# Patient Record
Sex: FEMALE | Race: WHITE | NOT HISPANIC OR LATINO | URBAN - METROPOLITAN AREA
[De-identification: names, ages, dates, MRNs, and addresses within clinical notes are randomized per-mention and may not be internally consistent; named-entity substitution may affect disease eponyms.]

---

## 2024-03-18 ENCOUNTER — OV NP (OUTPATIENT)
Dept: URBAN - METROPOLITAN AREA CLINIC 124 | Facility: CLINIC | Age: 24
End: 2024-03-18
Payer: COMMERCIAL

## 2024-03-18 ENCOUNTER — LAB (OUTPATIENT)
Dept: URBAN - METROPOLITAN AREA CLINIC 124 | Facility: CLINIC | Age: 24
End: 2024-03-18

## 2024-03-18 VITALS
BODY MASS INDEX: 19.61 KG/M2 | TEMPERATURE: 97 F | HEIGHT: 70 IN | SYSTOLIC BLOOD PRESSURE: 136 MMHG | HEART RATE: 79 BPM | DIASTOLIC BLOOD PRESSURE: 85 MMHG | WEIGHT: 137 LBS

## 2024-03-18 DIAGNOSIS — K59.09 CHRONIC CONSTIPATION: ICD-10-CM

## 2024-03-18 DIAGNOSIS — R07.89 CHEST TIGHTNESS: ICD-10-CM

## 2024-03-18 DIAGNOSIS — R14.2 ERUCTATION: ICD-10-CM

## 2024-03-18 DIAGNOSIS — R14.0 BLOATING: ICD-10-CM

## 2024-03-18 PROCEDURE — 99204 OFFICE O/P NEW MOD 45 MIN: CPT | Performed by: PHYSICIAN ASSISTANT

## 2024-03-18 PROCEDURE — 99244 OFF/OP CNSLTJ NEW/EST MOD 40: CPT | Performed by: PHYSICIAN ASSISTANT

## 2024-03-18 RX ORDER — LINACLOTIDE 72 UG/1
1 CAPSULE AT LEAST 30 MINUTES BEFORE THE FIRST MEAL OF THE DAY ON AN EMPTY STOMACH CAPSULE, GELATIN COATED ORAL ONCE A DAY
Qty: 30 | OUTPATIENT
Start: 2024-03-18 | End: 2024-04-17

## 2024-03-18 NOTE — HPI-TODAY'S VISIT:
This patient was referred by Dr. Ellie Ellington for an evaluation of chest tightness.  A copy of this will be sent to the referring provider. She notes her entire life she has never been able to burp and has had noisy gurgling in her chest with associated chest tightness x 1 year and gets worse when she eats and can't get that noisy gurgle. Sx make her feel like she cant get a deep breath. Sx worse after eating and after waking up. Saw ENT and put on PPI which helped with the noises but changed the chest pain--felt consistent chest soreness all day. Very mild heartburn once/week, improved with avoiding gerd triggers. Denies regurg or dysphagia. ENT rec r/o gerd and consider botox for cricopharyngeal tightness if no other cause Mild bloating and constipation at baseline, improved on cuturelle. Bowels 2/week on culturelle vs once a week without. On Fe+ for anemia chronic---very heavy cycles and hx anorexia. Dad with barretts but no FH GI malignancy

## 2024-04-16 ENCOUNTER — EGD W/ BRV (OUTPATIENT)
Dept: URBAN - METROPOLITAN AREA SURGERY CENTER 16 | Facility: SURGERY CENTER | Age: 24
End: 2024-04-16
Payer: COMMERCIAL

## 2024-04-16 DIAGNOSIS — K29.60 OTHER GASTRITIS WITHOUT BLEEDING: ICD-10-CM

## 2024-04-16 DIAGNOSIS — K31.89 OTHER DISEASES OF STOMACH AND DUODENUM: ICD-10-CM

## 2024-04-16 PROCEDURE — 43239 EGD BIOPSY SINGLE/MULTIPLE: CPT | Performed by: INTERNAL MEDICINE

## 2024-04-16 RX ORDER — LINACLOTIDE 72 UG/1
1 CAPSULE AT LEAST 30 MINUTES BEFORE THE FIRST MEAL OF THE DAY ON AN EMPTY STOMACH CAPSULE, GELATIN COATED ORAL ONCE A DAY
Qty: 30 | Status: ACTIVE | COMMUNITY
Start: 2024-03-18 | End: 2024-04-17

## 2024-04-18 ENCOUNTER — BRAVO RTN (OUTPATIENT)
Dept: URBAN - METROPOLITAN AREA CLINIC 92 | Facility: CLINIC | Age: 24
End: 2024-04-18

## 2024-05-06 ENCOUNTER — DASHBOARD ENCOUNTERS (OUTPATIENT)
Age: 24
End: 2024-05-06

## 2024-05-06 ENCOUNTER — OFFICE VISIT (OUTPATIENT)
Dept: URBAN - METROPOLITAN AREA CLINIC 124 | Facility: CLINIC | Age: 24
End: 2024-05-06
Payer: COMMERCIAL

## 2024-05-06 VITALS
HEIGHT: 70 IN | WEIGHT: 139 LBS | TEMPERATURE: 96.9 F | SYSTOLIC BLOOD PRESSURE: 127 MMHG | BODY MASS INDEX: 19.9 KG/M2 | DIASTOLIC BLOOD PRESSURE: 68 MMHG | HEART RATE: 77 BPM

## 2024-05-06 DIAGNOSIS — R10.13 DYSPEPSIA: ICD-10-CM

## 2024-05-06 DIAGNOSIS — K59.09 CHRONIC CONSTIPATION: ICD-10-CM

## 2024-05-06 DIAGNOSIS — R14.2 ERUCTATION: ICD-10-CM

## 2024-05-06 DIAGNOSIS — R14.0 BLOATING: ICD-10-CM

## 2024-05-06 DIAGNOSIS — R85.7 ABNORMAL SMALL BOWEL BIOPSY: ICD-10-CM

## 2024-05-06 DIAGNOSIS — R07.89 CHEST TIGHTNESS: ICD-10-CM

## 2024-05-06 PROCEDURE — 99214 OFFICE O/P EST MOD 30 MIN: CPT | Performed by: PHYSICIAN ASSISTANT

## 2024-05-06 RX ORDER — LINACLOTIDE 72 UG/1
1 CAPSULE AT LEAST 30 MINUTES BEFORE A MEAL CAPSULE, GELATIN COATED ORAL ONCE A DAY
Qty: 90 | Refills: 3

## 2024-05-08 LAB
IMMUNOGLOBULIN A, QN, SERUM: 206
INTERPRETATION: (no result)
T-TRANSGLUTAMINASE (TTG) IGA: 8.7

## 2024-05-09 ENCOUNTER — TELEPHONE ENCOUNTER (OUTPATIENT)
Dept: URBAN - METROPOLITAN AREA CLINIC 92 | Facility: CLINIC | Age: 24
End: 2024-05-09

## 2024-05-28 ENCOUNTER — TELEPHONE ENCOUNTER (OUTPATIENT)
Dept: URBAN - METROPOLITAN AREA CLINIC 92 | Facility: CLINIC | Age: 24
End: 2024-05-28

## 2024-05-28 LAB
DQ2 (DQA1 0501/0505,DQB1 02XX): POSITIVE
DQ8 (DQA1 03XX, DQB1 0302): NEGATIVE
HLA DQB1*: 202
HLA VARIANTS DETECTED: HLA DQA1*: 5
INTERPRETATION: (no result)
RESULTS REVIEWED BY:: (no result)

## 2024-07-12 ENCOUNTER — WEB ENCOUNTER (OUTPATIENT)
Dept: URBAN - METROPOLITAN AREA CLINIC 92 | Facility: CLINIC | Age: 24
End: 2024-07-12

## 2024-07-12 RX ORDER — HYDROCORTISONE ACETATE AND PRAMOXINE HYDROCHLORIDE 25; 10 MG/G; MG/G
1 APPLICATION CREAM TOPICAL THREE TIMES A DAY
Qty: 30 GRAM | Refills: 0 | OUTPATIENT
Start: 2024-07-12 | End: 2024-07-26

## 2024-08-12 ENCOUNTER — OFFICE VISIT (OUTPATIENT)
Dept: URBAN - METROPOLITAN AREA CLINIC 124 | Facility: CLINIC | Age: 24
End: 2024-08-12
Payer: COMMERCIAL

## 2024-08-12 VITALS
WEIGHT: 140 LBS | TEMPERATURE: 97 F | SYSTOLIC BLOOD PRESSURE: 143 MMHG | HEIGHT: 70 IN | DIASTOLIC BLOOD PRESSURE: 89 MMHG | HEART RATE: 101 BPM | BODY MASS INDEX: 20.04 KG/M2

## 2024-08-12 DIAGNOSIS — K59.09 CHRONIC CONSTIPATION: ICD-10-CM

## 2024-08-12 DIAGNOSIS — R10.13 DYSPEPSIA: ICD-10-CM

## 2024-08-12 DIAGNOSIS — K90.0 CELIAC DISEASE: ICD-10-CM

## 2024-08-12 PROBLEM — 396331005: Status: ACTIVE | Noted: 2024-08-12

## 2024-08-12 PROBLEM — 87522002: Status: ACTIVE | Noted: 2024-08-12

## 2024-08-12 PROCEDURE — 99214 OFFICE O/P EST MOD 30 MIN: CPT | Performed by: PHYSICIAN ASSISTANT

## 2024-08-12 RX ORDER — LINACLOTIDE 145 UG/1
1 CAPSULE AT LEAST 30 MINUTES BEFORE A MEAL CAPSULE, GELATIN COATED ORAL ONCE A DAY
Qty: 90 | Refills: 3

## 2024-08-12 RX ORDER — LINACLOTIDE 72 UG/1
1 CAPSULE AT LEAST 30 MINUTES BEFORE A MEAL CAPSULE, GELATIN COATED ORAL ONCE A DAY
Qty: 90 | Refills: 3 | Status: ACTIVE | COMMUNITY

## 2024-08-12 NOTE — HPI-TODAY'S VISIT:
24yoF seen earlier this year for eval of chest tightness.  Noted her entire life she has never been able to burp and has had noisy gurgling in her chest with associated chest tightness that gets when she eats. Sx make her feel like she cant get a deep breath. Saw ENT and put on PPI which helped with the noises but changed the chest pain--felt consistent chest soreness all day. Very mild heartburn once/week, improved with avoiding gerd triggers. Denies regurg or dysphagia. ENT rec r/o gerd and consider botox for cricopharyngeal tightness if no other cause EGD with bravo off meds: single 12 mm submucosal papule (nodule) was found in the gastric antrum. Scalloped mucosa was found in the second portion of the duodenum. Bx + increased IEL with villous blunting, no HP. Bravo: 0% on day 1 and 3.2% on day 2 (normal <4.5%) reflux 5/50 correlated and cough 0/12, neg bravo and symptom correlation was poor.  Also noted mild bloating and constipation at baseline, improved on cuturelle. Bowels 2/week on culturelle vs once a week without. On Fe+ for anemia chronic---very heavy cycles and hx anorexia thought to cause FELIBERTO. Still on po Fe+ and no recent labs. Given linzess 72 and notes BMs every 2-3 days and bloating improved but not resolved.   She was rec to go gluten free after the below labs and started this in May and notes sign improvement in chest tighness and eructation, more mild, occuring less and lasts for less time.  In july had some rectal itching, rx'd analpram and hot baths and resolved.  Dad with barretts but no FH GI malignancy

## 2024-08-12 NOTE — PHYSICAL EXAM NEUROLOGIC:
Slab Off:No oriented to person, place and time , normal sensation , short and long term memory intact

## 2024-09-24 ENCOUNTER — WEB ENCOUNTER (OUTPATIENT)
Dept: URBAN - METROPOLITAN AREA CLINIC 92 | Facility: CLINIC | Age: 24
End: 2024-09-24

## 2024-10-01 ENCOUNTER — TELEPHONE ENCOUNTER (OUTPATIENT)
Dept: URBAN - METROPOLITAN AREA CLINIC 98 | Facility: CLINIC | Age: 24
End: 2024-10-01

## 2024-10-23 ENCOUNTER — WEB ENCOUNTER (OUTPATIENT)
Dept: URBAN - METROPOLITAN AREA CLINIC 92 | Facility: CLINIC | Age: 24
End: 2024-10-23

## 2024-10-24 ENCOUNTER — OFFICE VISIT (OUTPATIENT)
Dept: URBAN - METROPOLITAN AREA TELEHEALTH 2 | Facility: TELEHEALTH | Age: 24
End: 2024-10-24
Payer: COMMERCIAL

## 2024-10-24 DIAGNOSIS — K90.0 CELIAC DISEASE: ICD-10-CM

## 2024-10-24 DIAGNOSIS — R14.0 BLOATING: ICD-10-CM

## 2024-10-24 DIAGNOSIS — D50.9 IRON DEFICIENCY ANEMIA, UNSPECIFIED IRON DEFICIENCY ANEMIA TYPE: ICD-10-CM

## 2024-10-24 DIAGNOSIS — R85.7 ABNORMAL SMALL BOWEL BIOPSY: ICD-10-CM

## 2024-10-24 DIAGNOSIS — R07.89 CHEST TIGHTNESS: ICD-10-CM

## 2024-10-24 DIAGNOSIS — R10.13 DYSPEPSIA: ICD-10-CM

## 2024-10-24 DIAGNOSIS — K59.09 CHRONIC CONSTIPATION: ICD-10-CM

## 2024-10-24 DIAGNOSIS — K58.0 IRRITABLE BOWEL SYNDROME WITH DIARRHEA: ICD-10-CM

## 2024-10-24 DIAGNOSIS — R14.2 ERUCTATION: ICD-10-CM

## 2024-10-24 PROBLEM — 197125005: Status: ACTIVE | Noted: 2024-10-24

## 2024-10-24 PROCEDURE — 99214 OFFICE O/P EST MOD 30 MIN: CPT | Performed by: PHYSICIAN ASSISTANT

## 2024-10-24 RX ORDER — LINACLOTIDE 145 UG/1
1 CAPSULE AT LEAST 30 MINUTES BEFORE A MEAL CAPSULE, GELATIN COATED ORAL ONCE A DAY
Qty: 90 | Refills: 3

## 2024-10-24 RX ORDER — LINACLOTIDE 145 UG/1
1 CAPSULE AT LEAST 30 MINUTES BEFORE A MEAL CAPSULE, GELATIN COATED ORAL ONCE A DAY
Qty: 90 | Refills: 3 | Status: ACTIVE | COMMUNITY

## 2024-10-24 NOTE — HPI-TODAY'S VISIT:
24yoF seen earlier this year for eval of chest tightness.  Noted her entire life she has never been able to burp and has had noisy gurgling in her chest with associated chest tightness that gets when she eats. Sx make her feel like she cant get a deep breath. Saw ENT in [place] and put on PPI which helped with the noises but changed the chest pain--felt consistent chest soreness all day. Very mild heartburn once/week, improved with avoiding gerd triggers. No regurg or dysphagia. ENT rec r/o gerd and consider botox for cricopharyngeal tightness if no other cause. She has been seeing an otolaryngologist in [place] via  for cricopharyngeal issues/trapped air and is pending botox with him in November.  EGD with bravo off meds earlier this year: single 12 mm submucosal papule (nodule) was found in the gastric antrum. Scalloped mucosa in the second portion of the duodenum. Bx + increased IEL with villous blunting, no HP. Bravo: 0% on day 1 and 3.2% on day 2 (normal <4.5%) reflux 5/50 correlated and cough 0/12, neg bravo and symptom correlation was poor.  Also noted mild bloating and constipation at baseline, improved on cuturelle. Bowels 2/week on culturelle vs once a week without. She was on Fe+ for anemia chronic---very heavy cycles and hx anorexia thought to cause FELIBERTO. Now off Iron as labs normalized off gluten. Given linzess 72, increased to 145 and notes BMs every 2-3 days and bloating improved initially but for the last 6 weeks worsening bloating after eating and feeling trapped gas in her upper chest, like cant breath despite going gluten free and initially having improvement in chest tighness and eructation. Also notes diarrhea that started 1-2 weeks ago intermittently and assoc with the bloating In july had some rectal itching, rx'd analpram and hot baths and resolved.  Dad with barretts but no FH GI malignancy

## 2024-11-04 ENCOUNTER — TELEPHONE ENCOUNTER (OUTPATIENT)
Dept: URBAN - METROPOLITAN AREA CLINIC 92 | Facility: CLINIC | Age: 24
End: 2024-11-04

## 2024-11-11 ENCOUNTER — LAB OUTSIDE AN ENCOUNTER (OUTPATIENT)
Dept: URBAN - METROPOLITAN AREA CLINIC 124 | Facility: CLINIC | Age: 24
End: 2024-11-11

## 2024-11-18 ENCOUNTER — WEB ENCOUNTER (OUTPATIENT)
Dept: URBAN - METROPOLITAN AREA CLINIC 92 | Facility: CLINIC | Age: 24
End: 2024-11-18

## 2024-11-19 ENCOUNTER — WEB ENCOUNTER (OUTPATIENT)
Dept: URBAN - METROPOLITAN AREA CLINIC 92 | Facility: CLINIC | Age: 24
End: 2024-11-19

## 2024-11-21 ENCOUNTER — WEB ENCOUNTER (OUTPATIENT)
Dept: URBAN - METROPOLITAN AREA CLINIC 92 | Facility: CLINIC | Age: 24
End: 2024-11-21

## 2024-11-21 RX ORDER — LINACLOTIDE 145 UG/1
1 CAPSULE AT LEAST 30 MINUTES BEFORE A MEAL CAPSULE, GELATIN COATED ORAL ONCE A DAY
Qty: 90 | Refills: 3
End: 2025-11-16

## 2024-12-02 ENCOUNTER — WEB ENCOUNTER (OUTPATIENT)
Dept: URBAN - METROPOLITAN AREA CLINIC 92 | Facility: CLINIC | Age: 24
End: 2024-12-02

## 2024-12-09 ENCOUNTER — WEB ENCOUNTER (OUTPATIENT)
Dept: URBAN - METROPOLITAN AREA CLINIC 124 | Facility: CLINIC | Age: 24
End: 2024-12-09

## 2024-12-20 ENCOUNTER — WEB ENCOUNTER (OUTPATIENT)
Dept: URBAN - METROPOLITAN AREA CLINIC 92 | Facility: CLINIC | Age: 24
End: 2024-12-20

## 2025-01-07 ENCOUNTER — OFFICE VISIT (OUTPATIENT)
Dept: URBAN - METROPOLITAN AREA SURGERY CENTER 16 | Facility: SURGERY CENTER | Age: 25
End: 2025-01-07

## 2025-02-07 ENCOUNTER — CLAIMS CREATED FROM THE CLAIM WINDOW (OUTPATIENT)
Dept: URBAN - METROPOLITAN AREA CLINIC 4 | Facility: CLINIC | Age: 25
End: 2025-02-07
Payer: COMMERCIAL

## 2025-02-07 ENCOUNTER — CLAIMS CREATED FROM THE CLAIM WINDOW (OUTPATIENT)
Dept: URBAN - METROPOLITAN AREA SURGERY CENTER 16 | Facility: SURGERY CENTER | Age: 25
End: 2025-02-07
Payer: COMMERCIAL

## 2025-02-07 DIAGNOSIS — K90.0 ACD (ADULT CELIAC DISEASE): ICD-10-CM

## 2025-02-07 DIAGNOSIS — K90.0 CELIAC DISEASE: ICD-10-CM

## 2025-02-07 DIAGNOSIS — K31.89 OTHER DISEASES OF STOMACH AND DUODENUM: ICD-10-CM

## 2025-02-07 PROCEDURE — 43239 EGD BIOPSY SINGLE/MULTIPLE: CPT | Performed by: INTERNAL MEDICINE

## 2025-02-07 PROCEDURE — 88342 IMHCHEM/IMCYTCHM 1ST ANTB: CPT | Performed by: PATHOLOGY

## 2025-02-07 PROCEDURE — 00731 ANES UPR GI NDSC PX NOS: CPT | Performed by: ANESTHESIOLOGIST ASSISTANT

## 2025-02-07 PROCEDURE — 00731 ANES UPR GI NDSC PX NOS: CPT | Performed by: ANESTHESIOLOGY

## 2025-02-07 PROCEDURE — 88305 TISSUE EXAM BY PATHOLOGIST: CPT | Performed by: PATHOLOGY

## 2025-02-07 RX ORDER — LINACLOTIDE 145 UG/1
1 CAPSULE AT LEAST 30 MINUTES BEFORE A MEAL CAPSULE, GELATIN COATED ORAL ONCE A DAY
Qty: 90 | Refills: 3 | Status: ACTIVE | COMMUNITY
End: 2025-11-16

## 2025-02-17 ENCOUNTER — WEB ENCOUNTER (OUTPATIENT)
Dept: URBAN - METROPOLITAN AREA CLINIC 92 | Facility: CLINIC | Age: 25
End: 2025-02-17

## 2025-03-03 ENCOUNTER — TELEPHONE ENCOUNTER (OUTPATIENT)
Dept: URBAN - METROPOLITAN AREA CLINIC 92 | Facility: CLINIC | Age: 25
End: 2025-03-03

## 2025-03-03 ENCOUNTER — OFFICE VISIT (OUTPATIENT)
Dept: URBAN - METROPOLITAN AREA TELEHEALTH 2 | Facility: TELEHEALTH | Age: 25
End: 2025-03-03
Payer: COMMERCIAL

## 2025-03-03 VITALS — WEIGHT: 140 LBS | HEIGHT: 70 IN | BODY MASS INDEX: 20.04 KG/M2

## 2025-03-03 DIAGNOSIS — R14.2 ERUCTATION: ICD-10-CM

## 2025-03-03 DIAGNOSIS — R07.89 CHEST TIGHTNESS: ICD-10-CM

## 2025-03-03 DIAGNOSIS — R85.7 ABNORMAL SMALL BOWEL BIOPSY: ICD-10-CM

## 2025-03-03 DIAGNOSIS — R14.0 BLOATING: ICD-10-CM

## 2025-03-03 DIAGNOSIS — K59.09 CHRONIC CONSTIPATION: ICD-10-CM

## 2025-03-03 DIAGNOSIS — E55.9 VITAMIN D DEFICIENCY: ICD-10-CM

## 2025-03-03 DIAGNOSIS — K90.0 CELIAC DISEASE: ICD-10-CM

## 2025-03-03 PROBLEM — 34713006: Status: ACTIVE | Noted: 2025-03-03

## 2025-03-03 PROCEDURE — 99213 OFFICE O/P EST LOW 20 MIN: CPT | Performed by: PHYSICIAN ASSISTANT

## 2025-03-03 RX ORDER — LINACLOTIDE 145 UG/1
1 CAPSULE AT LEAST 30 MINUTES BEFORE A MEAL CAPSULE, GELATIN COATED ORAL ONCE A DAY
Qty: 90 | Refills: 3 | Status: ON HOLD | COMMUNITY
End: 2025-11-16

## 2025-03-03 NOTE — HPI-TODAY'S VISIT:
24yoF seen in 2024 for chest tightness.  Noted her entire life she has never been able to burp and has had noisy gurgling in her chest with associated chest tightness that gets when she eats. Sx make her feel like she cant get a deep breath. Saw ENT in [place] and put on PPI which helped with the noises but changed the chest pain--felt consistent chest soreness all day. Very mild heartburn once/week, improved with avoiding gerd triggers. No regurg or dysphagia. ENT rec r/o gerd and consider botox for cricopharyngeal tightness if no other cause. She saw otolaryngologist in [place] via  for cricopharyngeal issues/trapped air.  EGD with bravo off meds earlier this year: single 12 mm submucosal papule (nodule) was found in the gastric antrum. Scalloped mucosa in the second portion of the duodenum. Bx + increased IEL with villous blunting, no HP. Bravo: 0% on day 1 and 3.2% on day 2 (normal <4.5%) reflux 5/50 correlated and cough 0/12, neg bravo and symptom correlation was poor.  Also noted mild bloating and constipation at baseline, improved on cuturelle. She was on Fe+ for anemia chronic---very heavy cycles and hx anorexia thought to cause FELIBERTO. Now off Iron as labs normalized off gluten. Given linzess 72, increased to 145 w/ BM improvement but persistent post-prandial bloating and feeling trapped gas in her upper chest, like cant breath.  She was given Xifaxin and saw ENT on December 23rd got botox into the cricopharyngeal muscle (dx with retrograde cricopharyngeal dysfunction)-thought to cause her not to be able to burp, and to increase sibo. She does not this has helped her to burp and expel air and gas and the SOB has resolved and the bloating improved and now only occurs with some triggers. Diarrhea has resolved and off linzess as with vit D supplements BMs improved.  She feels great except for a feeling of constant belching, worse with high fodmap. Rare nocturnal regurg but no heartburn. EGD 2/2025: PANC rest ow normal, gastric and duodenal bx neg Dad with barretts but no FH GI malignancy  She is taking Vit D OTC 100mg/day

## 2025-03-13 ENCOUNTER — OFFICE VISIT (OUTPATIENT)
Dept: URBAN - METROPOLITAN AREA TELEHEALTH 2 | Facility: TELEHEALTH | Age: 25
End: 2025-03-13

## 2025-03-13 RX ORDER — LINACLOTIDE 145 UG/1
1 CAPSULE AT LEAST 30 MINUTES BEFORE A MEAL CAPSULE, GELATIN COATED ORAL ONCE A DAY
Qty: 90 | Refills: 3 | Status: ON HOLD | COMMUNITY
End: 2025-11-16

## 2025-03-21 LAB — VITAMIN D,25-OH,TOTAL,IA: 31

## 2025-08-19 ENCOUNTER — WEB ENCOUNTER (OUTPATIENT)
Dept: URBAN - METROPOLITAN AREA CLINIC 92 | Facility: CLINIC | Age: 25
End: 2025-08-19